# Patient Record
Sex: FEMALE | Race: BLACK OR AFRICAN AMERICAN
[De-identification: names, ages, dates, MRNs, and addresses within clinical notes are randomized per-mention and may not be internally consistent; named-entity substitution may affect disease eponyms.]

---

## 2019-09-25 ENCOUNTER — HOSPITAL ENCOUNTER (EMERGENCY)
Dept: HOSPITAL 44 - ED | Age: 74
Discharge: HOME | End: 2019-09-25
Payer: COMMERCIAL

## 2019-09-25 VITALS — SYSTOLIC BLOOD PRESSURE: 119 MMHG | DIASTOLIC BLOOD PRESSURE: 85 MMHG

## 2019-09-25 DIAGNOSIS — K21.9: Primary | ICD-10-CM

## 2019-09-25 DIAGNOSIS — Z87.891: ICD-10-CM

## 2019-09-25 DIAGNOSIS — J41.0: ICD-10-CM

## 2019-09-25 LAB
APPEARANCE UR: CLEAR
BASOPHILS NFR BLD: 0.4 % (ref 0–1.5)
COLOR,URINE: YELLOW
EGFR (NON-AFRICAN): 33
MCV RBC AUTO: 82 FL (ref 80–100)
NEUTROPHILS #: 2.9 # K/UL (ref 1.4–7.7)
PH UR STRIP: 5.5 [PH] (ref 5–8)
RBC UR QL: (no result)
UROBILINOGEN URINE: 0.2 EU (ref 0.2–1)

## 2019-09-25 PROCEDURE — 99283 EMERGENCY DEPT VISIT LOW MDM: CPT

## 2019-09-25 PROCEDURE — 99284 EMERGENCY DEPT VISIT MOD MDM: CPT

## 2019-09-25 PROCEDURE — 80053 COMPREHEN METABOLIC PANEL: CPT

## 2019-09-25 PROCEDURE — 93005 ELECTROCARDIOGRAM TRACING: CPT

## 2019-09-25 PROCEDURE — S1016 NON-PVC INTRAVENOUS ADMINIST: HCPCS

## 2019-09-25 PROCEDURE — 81002 URINALYSIS NONAUTO W/O SCOPE: CPT

## 2019-09-25 PROCEDURE — 71046 X-RAY EXAM CHEST 2 VIEWS: CPT

## 2019-09-25 PROCEDURE — 84484 ASSAY OF TROPONIN QUANT: CPT

## 2019-09-25 PROCEDURE — 85025 COMPLETE CBC W/AUTO DIFF WBC: CPT

## 2019-09-25 NOTE — DIAGNOSTIC IMAGING REPORT
VIRGEN ALTAMIRANO (NP) - ER 

[facility]

[id number] [facility].[address] [address]

 

 

 

 

Report Submission Date: Sep 25, 2019 4:20:19 PM CDT

Patient       Study

Name:   ZULY KILGORE       Date:   Sep 25, 2019 3:49:49 PM CDT

MRN:   [id number]       Modality Type:   DX

Gender:   F       Description:   CHEST 2VIEW

:   49       Institution:   [facility]

Physician:   VIRGEN ALTAMIRANO (NP) - ER

     Accession:    [id number]

 

 

Exam: Chest two views.  



History: Shortness of breath.  



The examination is compared to study dated 2016.  



Lung fields are well aerated.  Linear infiltrates  in the lung bases bilaterally
are noted.  No pleural effusions are seen.  Heart size is normal with 
atherosclerotic plaques seen in the aorta.  Degenerate changes in the thoracic 
spine are seen.  



Impression:

Bibasilar infiltrates.

 

Electronically signed on Sep 25, 2019 4:20:19 PM CDT by:

Trevon TIDWELL

## 2019-09-25 NOTE — ED PHYSICIAN DOCUMENTATION
Chest Pain





- HISTORIAN


Historian: patient





- HPI


Stated Complaint: Chest pain


Chief Complaint: General Adult


Onset: days ago (2)


Last known Well Date: 09/22/19


Last Known Well Time: 08:00


Last known Well Code/Unknown Code: Unknown


Context: rest


Chest Pain Radiation: no radiation


Chest Pain Signs/Symptoms: denies: nausea, vomiting, diaphoresis, cool 

extremities, dizziness, dyspnea, tachypnea, tachycardia, hypotension, 

palpitations, weakness, other


Worsened By: nothing


Relieved By: nothing


Further Comments: yes (74 year old female patient presents with complaint of 

epigastric pain and shortness of breath.  Patient reports intermittent chest 

pain.  She states she was out of her stomach medicine for 2 weeks, restarted "a 

couple of days ago".  Patient reports SOB, states she does not have a proair at 

this time.  Patient denies cough, fever, N/V, diarrhea.)





- ROS


CONST: none


MS/LYMPH: none


GI/: none


EYES/ENT: none


SKIN/ENDO: none


NEURO/PSYCH: none





- PAST HX


MI risk factors: hypertension


GI disease: GERD


Lung disease: other (COPD, PE on eliquis)


Allergies/Adverse Reactions: 


                                    Allergies











Allergy/AdvReac Type Severity Reaction Status Date / Time


 


No Known Allergies Allergy   Verified 09/25/19 15:42











Home Medications: 


                                Ambulatory Orders











 Medication  Instructions  Recorded


 


Ranitidine HCl [Zantac] 150 mg PO DAILY 05/04/16


 


Tramadol HCl [Ultram] 50 mg PO Q8 05/04/16


 


Albuterol Sulfate [Proair HFA] 1 pkg INH AS DIRECTED 12/12/16


 


Albuterol Sulfate [Proair HFA] 1 inh IH Q4H PRN #1 hfa.aer.ad 09/25/19


 


Apixaban [Eliquis] 1 tab PO BID 09/25/19


 


Montelukast Sodium [Singulair] 1 tab PO DAILY 09/25/19


 


amLODIPine BESYLATE [Norvasc] 1 tab PO DAILY 09/25/19














- SOCIAL HX


Smoking History: quit greater than 1 year (1ppd x 32 years)





- FAMILY HX


Family HX: CAD over 55





- VITAL SIGNS


Vital Signs: 





                                   Vital Signs











Temp Pulse Resp BP Pulse Ox


 


    80   17   119/85   98 


 


    09/25/19 16:09  09/25/19 15:18  09/25/19 15:18  09/25/19 16:09














- REVIEWED ASSESSMENTS


Nursing Assessment  Reviewed: Yes


Vitals Reviewed: Yes





Progress





- Progress


Progress: 





1720 Patient states she is out of her pro-air; has been using some one else's 

proair because her insurance will not cover hers.  Good Rx card provided.  Will 

send new prescription. 





No chest pain or SOB while in ER. 











ED Results Lab/Radiology





- Lab Results


Lab Results: 





                                   Lab Results











  09/25/19 09/25/19





  15:38 15:35


 


WBC  5.30 K/ul K/ul  





   (4.00-12.00)  


 


RBC  5.26 M/ul H M/ul  





   (3.90-5.20)  


 


Hgb  14.6 g/dL g/dL  





   (11.5-16.0)  


 


Hct  43.3 % %  





   (34.5-46.5)  


 


MCV  82.0 fl fl  





   (80.0-100.0)  


 


MCH  27.7 pg L pg  





   (28.0-34.0)  


 


MCHC  33.6 g/dL g/dL  





   (30.0-36.0)  


 


RDW  15.3 % H %  





   (11.3-14.3)  


 


Plt Count  183 K/mm3 K/mm3  





   (130-400)  


 


Neut % (Auto)  54.5 % %  





   (39.0-79.0)  


 


Lymph % (Auto)  36.5 % %  





   (16.0-50.0)  


 


Mono % (Auto)  5.9 % %  





   (0.0-11.0)  


 


Eos % (Auto)  2.7 % %  





   (0.0-6.8)  


 


Baso % (Auto)  0.4 % %  





   (0.0-1.5)  


 


Neut # (Auto)  2.9 # k/uL # k/uL  





   (1.4-7.7)  


 


Lymph # (Auto)  1.9 # k/uL # k/uL  





   (0.6-4.0)  


 


Mono # (Auto)  0.3 # k/uL # k/uL  





   (0.0-0.9)  


 


Eos # (Auto)  0.1 # k/uL # k/uL  





   (0.0-0.6)  


 


Baso # (Auto)  0.0 # k/uL # k/uL  





   (0.0-0.5)  


 


Sodium    142 mmol/L mmol/L





    (137-145) 


 


Potassium    4.1 mmol/L mmol/L





    (3.5-5.1) 


 


Chloride    110 mmol/L H mmol/L





    () 


 


Carbon Dioxide    31 mmol/L H mmol/L





    (22-30) 


 


Anion Gap    5.1 





   


 


BUN    24 mg/dL H mg/dL





    (7-17) 


 


Creatinine    1.62 mg/dL H mg/dL





    (0.52-1.04) 


 


Estimated Creat Clear    64 





   


 


Est GFR ( Amer)    40  L 





   (60 - )


 


Est GFR (Non-Af Amer)    33  L 





   (60 - )


 


Glucose    110 mg/dL H mg/dL





    () 


 


Calcium    10.4 mg/dL H mg/dL





    (8.4-10.2) 


 


Total Bilirubin    0.6 mg/dL mg/dL





    (0.2-1.3) 


 


AST    31 U/L U/L





    (15-46) 


 


ALT    12 U/L L U/L





    (13-69) 


 


Alkaline Phosphatase    66 U/L U/L





    () 


 


Troponin I    < 0.012 ng/mL L ng/mL





    (0.012-0.034) 


 


Total Protein    8.5 g/dL H g/dL





    (6.3-8.2) 


 


Albumin    4.2 g/dL g/dL





    (3.5-5.0) 














- Radiology


Radiology Impressions: 





Exam: Chest two views.  





History: Shortness of breath.  





The examination is compared to study dated December 12, 2016.  





Lung fields are well aerated.  Linear infiltrates  in the lung bases bilaterally

 are noted.  No pleural effusions are seen.  Heart size is normal with 

atherosclerotic plaques seen in the aorta.  Degenerate changes in the thoracic 

spine are seen.  





Impression:


Bibasilar infiltrates.


 


Electronically signed on Sep 25, 2019 4:20:19 PM CDT by:


Trevon Luo





- Orders


Orders: 





                                    ED Orders











 Category Date Time Status


 


 Continuous EKG monitoring Q30M Care  09/25/19 15:39 Active


 


 Continuous Pulse Oximetry Q30M Care  09/25/19 15:39 Active


 


 Place IV Lock 1T Care  09/25/19 15:39 Active


 


 CHEST 2VIEW [RAD] Stat Exams  09/25/19 15:39 Completed


 


 CBC/PLATELET/DIFF Stat Lab  09/25/19 15:38 Completed


 


 CMP Stat Lab  09/25/19 15:35 Completed


 


 TROPONIN I Stat Lab  09/25/19 15:35 Completed


 


 UA W/MICRO IF INDICATED Stat Lab  09/25/19 15:39 Ordered


 


 Oxygen Daily Oxygen  09/25/19 15:45 Ordered














Chest Pain Physical Exam





- EXAM


General Appearance: no acute distress, lethargic


EENT: KUMAR


Respiratory: no resp. distress, chest non-tender, nml breath sounds


CVS: reg. rate & rhythm, no murmur, no gallop, no friction rub, pulses full, 

pulses equal, other (denies CP in the ER. )


Abdomen: soft, no organomegaly, normal bowel sounds, no abdominal bruit, no 

distension


Skin: normal color, warm/dry, NR, INT, DR


Extremities: non-tender, normal range of motion, no evidence of injury, no 

edema, J, NP


Neuro: oriented X3, CN's nml as tested, motor nml, sensation nml, mood/affect 

nml





Discharge


Clincal Impression: 


 Non-cardiac chest pain





GERD (gastroesophageal reflux disease)


Qualifiers:


 Esophagitis presence: without esophagitis Qualified Code(s): K21.9 - Gastro-

esophageal reflux disease without esophagitis





COPD (chronic obstructive pulmonary disease)


Qualifiers:


 COPD type: chronic bronchitis Chronic bronchitis type: simple Qualified 

Code(s): J41.0 - Simple chronic bronchitis





Prescriptions: 


Albuterol Sulfate [Proair HFA] 1 inh IH Q4H PRN #1 hfa.aer.ad


 PRN Reason: Wheezing


Referrals: 


Primary Doctor,No [Primary Care Provider] - 2 Days


Additional Instructions: 


Refill your proair today. 





Continue all of your current medications. 





Make a follow up appointment to see your primary care doctor next week. 








Return to the ER if you have Chest pain with shortness of breath, nausea and 

sweating all over. Or pain radiating to jaws or shoulders.


Condition: Stable


Disposition: 01 HOME, SELF-CARE


Decision to Admit: NO


Decision Time: 17:26